# Patient Record
Sex: FEMALE | Race: WHITE | URBAN - METROPOLITAN AREA
[De-identification: names, ages, dates, MRNs, and addresses within clinical notes are randomized per-mention and may not be internally consistent; named-entity substitution may affect disease eponyms.]

---

## 2021-12-06 ENCOUNTER — OFFICE VISIT (OUTPATIENT)
Dept: URBAN - METROPOLITAN AREA CLINIC 76 | Facility: CLINIC | Age: 81
End: 2021-12-06
Payer: COMMERCIAL

## 2021-12-06 DIAGNOSIS — H52.4 PRESBYOPIA: ICD-10-CM

## 2021-12-06 DIAGNOSIS — H35.371 PUCKERING OF MACULA, RIGHT EYE: ICD-10-CM

## 2021-12-06 DIAGNOSIS — H43.813 VITREOUS DEGENERATION, BILATERAL: ICD-10-CM

## 2021-12-06 PROCEDURE — 99204 OFFICE O/P NEW MOD 45 MIN: CPT | Performed by: OPHTHALMOLOGY

## 2021-12-06 PROCEDURE — 92134 CPTRZ OPH DX IMG PST SGM RTA: CPT | Performed by: OPHTHALMOLOGY

## 2021-12-06 ASSESSMENT — INTRAOCULAR PRESSURE
OS: 14
OD: 15

## 2021-12-06 ASSESSMENT — VISUAL ACUITY
OD: 20/25
OS: 20/30

## 2021-12-06 ASSESSMENT — KERATOMETRY
OD: 45.88
OS: 45.88

## 2021-12-06 NOTE — IMPRESSION/PLAN
Impression: Puckering of macula, right eye: H35.371. Right. MAC OCT done and reviewed today. Plan: Discussed. No retina consult needed at this time. Continue to monitor.

## 2021-12-06 NOTE — IMPRESSION/PLAN
Impression: Age-related nuclear cataract, bilateral: H25.13 Bilateral. Visually significant. Plan: Cataracts account for the patient's complaints. Discussed all risks, benefits, procedures and recovery. Patient understands changing glasses will not improve vision. Discussed added risk due to ERM. Advised no guarantee of glasses independence with any IOL, advised the need for glasses for dva and nva after surgery. Pt understands. Patient desires to have surgery. Schedule CE IOL OU, OS first then OD. Discussed IOL options, recommend STANDARD IOL. TARGET: DISTANCE. Pt wanting to consider NVA aim (-2.50). PT TO CALL AND CONFIRM AIM. Discussed in detail DVA target vs NVA target. Advised patient with DVA target she will become much more dependent on mrx for near vision and will have increased difficult with near work without correction and vice versa. Patient understands. RL2. Recommend DEXYCU + MOXIFLOXACIN injection. Recommend ORA. ATROPINE, VISCOAT, HOOKS. Pt needs ASCAN. Will rely on Dr. Jennifer Junior for primary and post operative eye care.

## 2022-01-25 ENCOUNTER — PRE-OPERATIVE VISIT (OUTPATIENT)
Dept: URBAN - METROPOLITAN AREA CLINIC 76 | Facility: CLINIC | Age: 82
End: 2022-01-25
Payer: MEDICARE

## 2022-01-25 ASSESSMENT — PACHYMETRY
OS: 25.53
OS: 3.28
OD: 25.43
OD: 3.32

## 2022-02-07 ENCOUNTER — SURGERY (OUTPATIENT)
Dept: URBAN - METROPOLITAN AREA SURGERY 47 | Facility: SURGERY | Age: 82
End: 2022-02-07
Payer: MEDICARE

## 2022-02-07 DIAGNOSIS — H25.13 AGE-RELATED NUCLEAR CATARACT, BILATERAL: Primary | ICD-10-CM

## 2022-02-07 PROCEDURE — PR1CP PR1CP: CUSTOM | Performed by: OPHTHALMOLOGY

## 2022-02-21 ENCOUNTER — SURGERY (OUTPATIENT)
Dept: URBAN - METROPOLITAN AREA SURGERY 47 | Facility: SURGERY | Age: 82
End: 2022-02-21
Payer: MEDICARE

## 2022-02-21 DIAGNOSIS — H25.11 AGE-RELATED NUCLEAR CATARACT, RIGHT EYE: Primary | ICD-10-CM

## 2022-02-21 PROCEDURE — PR1FY PR1FY: CUSTOM | Performed by: OPHTHALMOLOGY
